# Patient Record
Sex: FEMALE | Race: WHITE | NOT HISPANIC OR LATINO | Employment: FULL TIME | URBAN - NONMETROPOLITAN AREA
[De-identification: names, ages, dates, MRNs, and addresses within clinical notes are randomized per-mention and may not be internally consistent; named-entity substitution may affect disease eponyms.]

---

## 2023-03-07 ENCOUNTER — HOSPITAL ENCOUNTER (EMERGENCY)
Facility: HOSPITAL | Age: 18
Discharge: HOME | End: 2023-03-07
Attending: EMERGENCY MEDICINE | Admitting: EMERGENCY MEDICINE
Payer: MEDICAID

## 2023-03-07 VITALS
TEMPERATURE: 99.5 F | HEIGHT: 66 IN | BODY MASS INDEX: 17.68 KG/M2 | RESPIRATION RATE: 18 BRPM | WEIGHT: 110 LBS | HEART RATE: 92 BPM | SYSTOLIC BLOOD PRESSURE: 107 MMHG | DIASTOLIC BLOOD PRESSURE: 67 MMHG | OXYGEN SATURATION: 99 %

## 2023-03-07 DIAGNOSIS — J03.90 TONSILLITIS: Primary | ICD-10-CM

## 2023-03-07 DIAGNOSIS — R59.0 LYMPHADENOPATHY, CERVICAL: ICD-10-CM

## 2023-03-07 LAB
ALBUMIN SERPL-MCNC: 3.7 G/DL (ref 3.5–5.2)
ALBUMIN/GLOB SERPL: 1 G/DL
ALP SERPL-CCNC: 71 U/L (ref 39–117)
ALT SERPL W P-5'-P-CCNC: 5 U/L (ref 1–33)
ANION GAP SERPL CALCULATED.3IONS-SCNC: 9.5 MMOL/L (ref 5–15)
AST SERPL-CCNC: 10 U/L (ref 1–32)
B-HCG UR QL: NEGATIVE
BASOPHILS # BLD AUTO: 0.06 10*3/MM3 (ref 0–0.2)
BASOPHILS NFR BLD AUTO: 0.4 % (ref 0–1.5)
BILIRUB SERPL-MCNC: 0.3 MG/DL (ref 0–1.2)
BUN SERPL-MCNC: 5 MG/DL (ref 6–20)
BUN/CREAT SERPL: 12.5 (ref 7–25)
CALCIUM SPEC-SCNC: 8.9 MG/DL (ref 8.6–10.5)
CHLORIDE SERPL-SCNC: 102 MMOL/L (ref 98–107)
CO2 SERPL-SCNC: 21.5 MMOL/L (ref 22–29)
CREAT SERPL-MCNC: 0.4 MG/DL (ref 0.57–1)
DEPRECATED RDW RBC AUTO: 41.8 FL (ref 37–54)
EGFRCR SERPLBLD CKD-EPI 2021: 146.4 ML/MIN/1.73
EOSINOPHIL # BLD AUTO: 0.02 10*3/MM3 (ref 0–0.4)
EOSINOPHIL NFR BLD AUTO: 0.1 % (ref 0.3–6.2)
ERYTHROCYTE [DISTWIDTH] IN BLOOD BY AUTOMATED COUNT: 15.4 % (ref 12.3–15.4)
GLOBULIN UR ELPH-MCNC: 3.6 GM/DL
GLUCOSE SERPL-MCNC: 106 MG/DL (ref 65–99)
HCT VFR BLD AUTO: 38.8 % (ref 34–46.6)
HETEROPH AB SER QL LA: NEGATIVE
HGB BLD-MCNC: 12.3 G/DL (ref 12–15.9)
IMM GRANULOCYTES # BLD AUTO: 0.04 10*3/MM3 (ref 0–0.05)
IMM GRANULOCYTES NFR BLD AUTO: 0.3 % (ref 0–0.5)
LYMPHOCYTES # BLD AUTO: 2.27 10*3/MM3 (ref 0.7–3.1)
LYMPHOCYTES NFR BLD AUTO: 16.5 % (ref 19.6–45.3)
MCH RBC QN AUTO: 23.9 PG (ref 26.6–33)
MCHC RBC AUTO-ENTMCNC: 31.7 G/DL (ref 31.5–35.7)
MCV RBC AUTO: 75.5 FL (ref 79–97)
MONOCYTES # BLD AUTO: 1.55 10*3/MM3 (ref 0.1–0.9)
MONOCYTES NFR BLD AUTO: 11.3 % (ref 5–12)
NEUTROPHILS NFR BLD AUTO: 71.4 % (ref 42.7–76)
NEUTROPHILS NFR BLD AUTO: 9.79 10*3/MM3 (ref 1.7–7)
NRBC BLD AUTO-RTO: 0 /100 WBC (ref 0–0.2)
PLATELET # BLD AUTO: 259 10*3/MM3 (ref 140–450)
PMV BLD AUTO: 9.8 FL (ref 6–12)
POTASSIUM SERPL-SCNC: 3.7 MMOL/L (ref 3.5–5.2)
PROT SERPL-MCNC: 7.3 G/DL (ref 6–8.5)
RBC # BLD AUTO: 5.14 10*6/MM3 (ref 3.77–5.28)
S PYO AG THROAT QL: NEGATIVE
SODIUM SERPL-SCNC: 133 MMOL/L (ref 136–145)
WBC NRBC COR # BLD: 13.73 10*3/MM3 (ref 3.4–10.8)

## 2023-03-07 PROCEDURE — 85025 COMPLETE CBC W/AUTO DIFF WBC: CPT | Performed by: PHYSICIAN ASSISTANT

## 2023-03-07 PROCEDURE — 36415 COLL VENOUS BLD VENIPUNCTURE: CPT

## 2023-03-07 PROCEDURE — 81025 URINE PREGNANCY TEST: CPT | Performed by: PHYSICIAN ASSISTANT

## 2023-03-07 PROCEDURE — 87081 CULTURE SCREEN ONLY: CPT | Performed by: PHYSICIAN ASSISTANT

## 2023-03-07 PROCEDURE — 80053 COMPREHEN METABOLIC PANEL: CPT | Performed by: PHYSICIAN ASSISTANT

## 2023-03-07 PROCEDURE — 87880 STREP A ASSAY W/OPTIC: CPT | Performed by: PHYSICIAN ASSISTANT

## 2023-03-07 PROCEDURE — 25010000002 KETOROLAC TROMETHAMINE PER 15 MG: Performed by: PHYSICIAN ASSISTANT

## 2023-03-07 PROCEDURE — 86308 HETEROPHILE ANTIBODY SCREEN: CPT | Performed by: PHYSICIAN ASSISTANT

## 2023-03-07 PROCEDURE — 99283 EMERGENCY DEPT VISIT LOW MDM: CPT

## 2023-03-07 PROCEDURE — 96374 THER/PROPH/DIAG INJ IV PUSH: CPT

## 2023-03-07 PROCEDURE — 87147 CULTURE TYPE IMMUNOLOGIC: CPT | Performed by: PHYSICIAN ASSISTANT

## 2023-03-07 RX ORDER — AMOXICILLIN 500 MG/1
1000 CAPSULE ORAL DAILY
Qty: 18 CAPSULE | Refills: 0 | Status: SHIPPED | OUTPATIENT
Start: 2023-03-08 | End: 2023-03-17

## 2023-03-07 RX ORDER — ONDANSETRON 4 MG/1
4 TABLET, ORALLY DISINTEGRATING ORAL EVERY 6 HOURS PRN
Qty: 8 TABLET | Refills: 0 | Status: SHIPPED | OUTPATIENT
Start: 2023-03-07 | End: 2023-03-09

## 2023-03-07 RX ORDER — KETOROLAC TROMETHAMINE 30 MG/ML
15 INJECTION, SOLUTION INTRAMUSCULAR; INTRAVENOUS ONCE
Status: COMPLETED | OUTPATIENT
Start: 2023-03-07 | End: 2023-03-07

## 2023-03-07 RX ORDER — AMOXICILLIN 500 MG/1
1000 CAPSULE ORAL ONCE
Status: COMPLETED | OUTPATIENT
Start: 2023-03-07 | End: 2023-03-07

## 2023-03-07 RX ORDER — SODIUM CHLORIDE 0.9 % (FLUSH) 0.9 %
10 SYRINGE (ML) INJECTION AS NEEDED
Status: DISCONTINUED | OUTPATIENT
Start: 2023-03-07 | End: 2023-03-07 | Stop reason: HOSPADM

## 2023-03-07 RX ADMIN — AMOXICILLIN 1000 MG: 500 CAPSULE ORAL at 11:21

## 2023-03-07 RX ADMIN — KETOROLAC TROMETHAMINE 15 MG: 30 INJECTION, SOLUTION INTRAMUSCULAR; INTRAVENOUS at 10:51

## 2023-03-07 RX ADMIN — SODIUM CHLORIDE 1000 ML: 9 INJECTION, SOLUTION INTRAVENOUS at 09:38

## 2023-03-07 NOTE — DISCHARGE INSTRUCTIONS
Symptoms could be related to bacterial or viral illness.  Mono test was negative here today but may need to be retested since she can have false negatives early in the disease course.  Take antibiotics as directed until medication is complete to help treat any underlying bacterial etiology.  You can take Zofran as needed for nausea vomiting.  Alternate ibuprofen and Tylenol to help with body aches, fever, pain.  Drink plenty of fluids to stay well-hydrated.  Try to follow-up with primary care provider in the next 1 to 2 days to reevaluate symptoms and ensure you are improving.  Return to the ER for any change, worsening symptoms, or any additional concerns including but not limited to inability to manage secretions or swallow, persistent fever, intractable vomiting, swelling of the neck or throat.

## 2023-03-07 NOTE — ED PROVIDER NOTES
Subjective  History of Present Illness:    Chief Complaint: Sore throat  History of Present Illness: Patient is a 17-year-old female with history of arthritis and autoimmune disease presenting to the ER for evaluation of sore throat.  Patient states for the past 4 days she has had headache, sore throat, body aches and fever.  Reportedly she was seen in urgent treatment center yesterday where she tested negative for COVID, influenza and strep.  They gave her steroids which are not helping.  She states that she has an enlarged lymph node on the right side of her neck that is tender to palpation.  She denies any vision loss or changes, neck pain or stiffness, inability to swallow or manage secretions, chest pain, cough, shortness of breath, abdominal pain, dysuria, hematuria, emesis, diarrhea, or any other symptoms.  She denies any known recent sick contacts.  Onset: 4 days  Duration: Persistent  Exacerbating / Alleviating factors: None  Associated symptoms: Fever, body aches, headache      Nurses Notes reviewed and agree, including vitals, allergies, social history and prior medical history.     REVIEW OF SYSTEMS: All systems reviewed and not pertinent unless noted.  Review of Systems      Positive for: Fever, body aches, headache, sore throat    Negative for: Vision loss or changes, neck pain or stiffness, inability to manage secretions, chest pain, cough, shortness of breath, dizziness, syncope, abdominal pain, emesis, diarrhea, dysuria, hematuria    Past Medical History:   Diagnosis Date   • Arthritis    • Autoimmune disease (HCC)        Allergies:    Patient has no known allergies.      History reviewed. No pertinent surgical history.      Social History     Socioeconomic History   • Marital status: Unknown   Tobacco Use   • Smoking status: Never   • Smokeless tobacco: Never   Vaping Use   • Vaping Use: Every day   • Substances: Nicotine, Flavoring   • Devices: Disposable   Substance and Sexual Activity   •  "Alcohol use: Yes   • Drug use: Never   • Sexual activity: Defer         History reviewed. No pertinent family history.    Objective  Physical Exam:  /67 (BP Location: Left arm, Patient Position: Sitting)   Pulse 92   Temp 99.5 °F (37.5 °C) (Oral)   Resp 18   Ht 167.6 cm (66\")   Wt 49.9 kg (110 lb)   LMP 03/01/2023 (Exact Date)   SpO2 99%   BMI 17.75 kg/m²      Physical Exam    CONSTITUTIONAL: Well developed, no acute distress, nontoxic in appearance.  She is awake, alert, speaking in full sentences.  She is managing secretions without difficulty  VITAL SIGNS: per nursing, reviewed and noted  Neck: Right-sided cervical lymphadenopathy palpated  SKIN: exposed skin with no rashes, ulcerations or petechiae  EYES: Grossly EOMI, no icterus  ENT: Normal voice.  Posterior pharynx with some mild erythema.  Patient has tonsillar exudate bilaterally without significant edema, uvula midline, soft palate elevates symmetric with phonation.  Tympanic membranes are clear bilaterally without bulging or erythema  RESPIRATORY:  No increased work of breathing. No retractions.   CARDIOVASCULAR:  regular rate and rhythm  GI: Abdomen soft, nontender, normal bowel sounds. No hernia. No ascites.  MUSCULOSKELETAL:  No tenderness. Full ROM. Strength and tone grossly normal.  NEUROLOGIC: Alert, oriented x 3. No gross deficits. GCS 15.   PSYCH: appropriate affect.    Procedures    ED Course:        ED Course as of 03/07/23 1701   Tue Mar 07, 2023   0852 WBC(!): 13.73 [LA]   0943 Hemoglobin: 12.3 [LA]   0943 Platelets: 259 [LA]   0947 Strep A Ag: Negative [LA]   0947 HCG, Urine QL: Negative [LA]   1008 Glucose(!): 106 [LA]   1008 BUN(!): 5 [LA]   1008 Creatinine(!): 0.40 [LA]   1008 Sodium(!): 133 [LA]   1008 Potassium: 3.7 [LA]   1008 Chloride: 102 [LA]   1008 CO2(!): 21.5 [LA]   1008 Calcium: 8.9 [LA]   1008 Total Protein: 7.3 [LA]   1008 Albumin: 3.7 [LA]   1008 ALT (SGPT): 5 [LA]   1008 AST (SGOT): 10 [LA]   1008 Alkaline " Phosphatase: 71 [LA]   1008 Total Bilirubin: 0.3 [LA]   1008 Globulin: 3.6 [LA]   1008 BUN/Creatinine Ratio: 12.5 [LA]   1008 Anion Gap: 9.5 [LA]   1008 eGFR: 146.4 [LA]   1011 Monospot: Negative [LA]   1107 Patient resting comfortably in the stretcher.  We will go ahead and treat with an antibiotic to cover any bacterial etiology, discussed follow-up with primary care provider strict return precautions to the ER. [LA]      ED Course User Index  [LA] Diana Florentino PA-C       Lab Results (last 24 hours)     Procedure Component Value Units Date/Time    Rapid Strep A Screen - Swab, Throat [[id number]]  (Normal) Collected: 03/07/23 0935    Specimen: Swab from Throat Updated: 03/07/23 0947     Strep A Ag Negative    Beta Strep Culture, Throat - Swab, Throat [[id number]] Collected: 03/07/23 0935    Specimen: Swab from Throat Updated: 03/07/23 0947    CBC & Differential [[id number]]  (Abnormal) Collected: 03/07/23 0936    Specimen: Blood Updated: 03/07/23 0942    Narrative:      The following orders were created for panel order CBC & Differential.  Procedure                               Abnormality         Status                     ---------                               -----------         ------                     CBC Auto Differential[[id number]]        Abnormal            Final result                 Please view results for these tests on the individual orders.    Comprehensive Metabolic Panel [[id number]]  (Abnormal) Collected: 03/07/23 0936    Specimen: Blood Updated: 03/07/23 1008     Glucose 106 mg/dL      BUN 5 mg/dL      Creatinine 0.40 mg/dL      Sodium 133 mmol/L      Potassium 3.7 mmol/L      Chloride 102 mmol/L      CO2 21.5 mmol/L      Calcium 8.9 mg/dL      Total Protein 7.3 g/dL      Albumin 3.7 g/dL      ALT (SGPT) 5 U/L      AST (SGOT) 10 U/L      Alkaline Phosphatase 71 U/L      Total Bilirubin 0.3 mg/dL      Globulin 3.6 gm/dL      A/G Ratio 1.0 g/dL      BUN/Creatinine Ratio 12.5     Anion Gap 9.5  mmol/L      eGFR 146.4 mL/min/1.73     Narrative:      GFR Normal >60  Chronic Kidney Disease <60  Kidney Failure <15      Mononucleosis Screen [[id number]]  (Normal) Collected: 03/07/23 0936    Specimen: Blood Updated: 03/07/23 1010     Monospot Negative    CBC Auto Differential [[id number]]  (Abnormal) Collected: 03/07/23 0936    Specimen: Blood Updated: 03/07/23 0942     WBC 13.73 10*3/mm3      RBC 5.14 10*6/mm3      Hemoglobin 12.3 g/dL      Hematocrit 38.8 %      MCV 75.5 fL      MCH 23.9 pg      MCHC 31.7 g/dL      RDW 15.4 %      RDW-SD 41.8 fl      MPV 9.8 fL      Platelets 259 10*3/mm3      Neutrophil % 71.4 %      Lymphocyte % 16.5 %      Monocyte % 11.3 %      Eosinophil % 0.1 %      Basophil % 0.4 %      Immature Grans % 0.3 %      Neutrophils, Absolute 9.79 10*3/mm3      Lymphocytes, Absolute 2.27 10*3/mm3      Monocytes, Absolute 1.55 10*3/mm3      Eosinophils, Absolute 0.02 10*3/mm3      Basophils, Absolute 0.06 10*3/mm3      Immature Grans, Absolute 0.04 10*3/mm3      nRBC 0.0 /100 WBC     Pregnancy, Urine - Urine, Clean Catch [[id number]]  (Normal) Collected: 03/07/23 0937    Specimen: Urine, Clean Catch Updated: 03/07/23 0955     HCG, Urine QL Negative           No radiology results from the last 24 hrs       MDM    Initial impression of presenting illness: Patient is a 17-year-old female presenting to the ER for evaluation of sore throat and headache.  On arrival she is awake alert, speaking in full sentences.  She is managing secretions without difficulty    DDX: includes but is not limited to: Viral illness, mononucleosis, tonsillitis, lymphadenitis, strep pharyngitis, and other concerns.  I have low concern for epiglottitis, tonsillar retropharyngeal abscess given patient is managing secretions without difficulty    Patient arrives in stable condition with vitals interpreted by myself.     Pertinent features from physical exam: Bilateral tonsillar exudate, no nuchal rigidity, right-sided cervical  lymphadenopathy    Initial diagnostic plan: Obtain basic labs, mononucleosis, urine pregnancy test we will give IV fluids, Tylenol    Results from initial plan were reviewed and interpreted by me revealing leukocytosis of 13.73, no other significant electrolyte abnormalities.  Mono and pregnancy test negative.  Rapid strep negative.  This was sent for culture.    Diagnostic information from other sources: Chart review    Interventions / Re-evaluation: Patient remained stable throughout visit here.    Results/clinical rationale were discussed with patient and grandfather at bedside with her permission.  Discussed that this looks like it could be mononucleosis versus a tonsillitis.  I had low concern for abscess given she was able to manage secretions in there and there is no peritonsillar edema.  We will go ahead and treat with antibiotics to cover any light underlying bacterial etiology, discussed other symptomatic care, follow-up and strict return precautions.  She verbalized understanding was in agreement with this plan of care    Consultations/Discussion of results with other physicians: Dr. Martin    Disposition plan: Discharge  -----    Final diagnoses:   Tonsillitis   Lymphadenopathy, cervical        Diana Florentino PA-C  03/07/23 3777

## 2023-03-08 LAB — BACTERIA SPEC AEROBE CULT: ABNORMAL

## 2023-04-16 ENCOUNTER — APPOINTMENT (OUTPATIENT)
Dept: GENERAL RADIOLOGY | Facility: HOSPITAL | Age: 18
End: 2023-04-16
Payer: MEDICAID

## 2023-04-16 ENCOUNTER — HOSPITAL ENCOUNTER (EMERGENCY)
Facility: HOSPITAL | Age: 18
Discharge: HOME | End: 2023-04-16
Attending: EMERGENCY MEDICINE | Admitting: EMERGENCY MEDICINE
Payer: MEDICAID

## 2023-04-16 VITALS
RESPIRATION RATE: 14 BRPM | DIASTOLIC BLOOD PRESSURE: 77 MMHG | SYSTOLIC BLOOD PRESSURE: 121 MMHG | WEIGHT: 100 LBS | TEMPERATURE: 97.8 F | BODY MASS INDEX: 18.4 KG/M2 | HEIGHT: 62 IN | OXYGEN SATURATION: 100 % | HEART RATE: 89 BPM

## 2023-04-16 DIAGNOSIS — R91.1 LUNG NODULE SEEN ON IMAGING STUDY: ICD-10-CM

## 2023-04-16 DIAGNOSIS — R07.9 CHEST PAIN, UNSPECIFIED TYPE: Primary | ICD-10-CM

## 2023-04-16 LAB
ALBUMIN SERPL-MCNC: 4.6 G/DL (ref 3.5–5.2)
ALBUMIN/GLOB SERPL: 1.4 G/DL
ALP SERPL-CCNC: 89 U/L (ref 39–117)
ALT SERPL W P-5'-P-CCNC: 7 U/L (ref 1–33)
ANION GAP SERPL CALCULATED.3IONS-SCNC: 10.9 MMOL/L (ref 5–15)
AST SERPL-CCNC: 14 U/L (ref 1–32)
BASOPHILS # BLD AUTO: 0.1 10*3/MM3 (ref 0–0.2)
BASOPHILS NFR BLD AUTO: 0.7 % (ref 0–1.5)
BILIRUB SERPL-MCNC: 0.2 MG/DL (ref 0–1.2)
BUN SERPL-MCNC: 6 MG/DL (ref 6–20)
BUN/CREAT SERPL: 13.6 (ref 7–25)
CALCIUM SPEC-SCNC: 9.3 MG/DL (ref 8.6–10.5)
CHLORIDE SERPL-SCNC: 102 MMOL/L (ref 98–107)
CO2 SERPL-SCNC: 22.1 MMOL/L (ref 22–29)
CREAT SERPL-MCNC: 0.44 MG/DL (ref 0.57–1)
DEPRECATED RDW RBC AUTO: 44.5 FL (ref 37–54)
EGFRCR SERPLBLD CKD-EPI 2021: 143.1 ML/MIN/1.73
EOSINOPHIL # BLD AUTO: 0.06 10*3/MM3 (ref 0–0.4)
EOSINOPHIL NFR BLD AUTO: 0.4 % (ref 0.3–6.2)
ERYTHROCYTE [DISTWIDTH] IN BLOOD BY AUTOMATED COUNT: 16.1 % (ref 12.3–15.4)
GLOBULIN UR ELPH-MCNC: 3.4 GM/DL
GLUCOSE SERPL-MCNC: 97 MG/DL (ref 65–99)
HCT VFR BLD AUTO: 44.8 % (ref 34–46.6)
HGB BLD-MCNC: 13.8 G/DL (ref 12–15.9)
IMM GRANULOCYTES # BLD AUTO: 0.06 10*3/MM3 (ref 0–0.05)
IMM GRANULOCYTES NFR BLD AUTO: 0.4 % (ref 0–0.5)
LYMPHOCYTES # BLD AUTO: 2.67 10*3/MM3 (ref 0.7–3.1)
LYMPHOCYTES NFR BLD AUTO: 17.9 % (ref 19.6–45.3)
MCH RBC QN AUTO: 23.6 PG (ref 26.6–33)
MCHC RBC AUTO-ENTMCNC: 30.8 G/DL (ref 31.5–35.7)
MCV RBC AUTO: 76.6 FL (ref 79–97)
MONOCYTES # BLD AUTO: 0.66 10*3/MM3 (ref 0.1–0.9)
MONOCYTES NFR BLD AUTO: 4.4 % (ref 5–12)
NEUTROPHILS NFR BLD AUTO: 11.4 10*3/MM3 (ref 1.7–7)
NEUTROPHILS NFR BLD AUTO: 76.2 % (ref 42.7–76)
NRBC BLD AUTO-RTO: 0 /100 WBC (ref 0–0.2)
PLATELET # BLD AUTO: 414 10*3/MM3 (ref 140–450)
PMV BLD AUTO: 10.3 FL (ref 6–12)
POTASSIUM SERPL-SCNC: 4.4 MMOL/L (ref 3.5–5.2)
PROT SERPL-MCNC: 8 G/DL (ref 6–8.5)
RBC # BLD AUTO: 5.85 10*6/MM3 (ref 3.77–5.28)
SODIUM SERPL-SCNC: 135 MMOL/L (ref 136–145)
TROPONIN T SERPL HS-MCNC: <6 NG/L
WBC NRBC COR # BLD: 14.95 10*3/MM3 (ref 3.4–10.8)

## 2023-04-16 PROCEDURE — 93005 ELECTROCARDIOGRAM TRACING: CPT

## 2023-04-16 PROCEDURE — 85025 COMPLETE CBC W/AUTO DIFF WBC: CPT

## 2023-04-16 PROCEDURE — 99283 EMERGENCY DEPT VISIT LOW MDM: CPT

## 2023-04-16 PROCEDURE — 80053 COMPREHEN METABOLIC PANEL: CPT

## 2023-04-16 PROCEDURE — 71045 X-RAY EXAM CHEST 1 VIEW: CPT

## 2023-04-16 PROCEDURE — 36415 COLL VENOUS BLD VENIPUNCTURE: CPT

## 2023-04-16 PROCEDURE — 84484 ASSAY OF TROPONIN QUANT: CPT

## 2023-04-16 NOTE — DISCHARGE INSTRUCTIONS
Return to emergency room for any worsening symptoms.  Recommend following up with primary care for further evaluation in addition to the finding on the chest x-ray today of the lung nodule.  Furthermore recommend following up with cardiology.  Please come back to ER for any worsening chest pain or shortness of air.

## 2023-04-16 NOTE — ED PROVIDER NOTES
"Subjective  History of Present Illness:    This is a 17-year-old female presents emergency room today, history of autoimmune disease, arthritis, and anxiety/panic attacks who presents to the emergency room today for evaluation of lung pain.  Patient reports that this woke her out of her sleep.  Reports that she woke up, had lung pain bilaterally underneath her ribs, and felt like she was going to die.  She arrived via EMS.  Reports that her symptoms have now subsided and she does not have any chest pain shortness of air or any other physical symptoms at this time.  She does have history of mild panic attacks in the past.  She was recently placed on a beta-blocker for situational anxiety.  Denies any chest pain.  Denies any unilateral leg pain or swelling.  Denies any hemoptysis.  No recent travel.  No falls or injuries to the chest.  She is not on any birth control.  Denies any cough congestion or runny nose.  Denies any fevers.  She reports that she also has not had her ADHD medicine in approximately 1 week at this time.  She did report shortness of air associated with worsening lung pain with inspiration when it occurred.      Nurses Notes reviewed and agree, including vitals, allergies, social history and prior medical history.     REVIEW OF SYSTEMS: All systems reviewed and not pertinent unless noted.  Review of Systems   Constitutional: Negative for fever.   Respiratory: Positive for shortness of breath. Negative for cough.         Reports \"lung pain\".   Cardiovascular: Negative for chest pain, palpitations and leg swelling.   Gastrointestinal: Negative for nausea and vomiting.   All other systems reviewed and are negative.      Past Medical History:   Diagnosis Date   • Arthritis    • Autoimmune disease        Allergies:    Patient has no known allergies.      History reviewed. No pertinent surgical history.      Social History     Socioeconomic History   • Marital status: Single   Tobacco Use   • Smoking " "status: Never   • Smokeless tobacco: Never   Vaping Use   • Vaping Use: Every day   • Substances: Nicotine, Flavoring   • Devices: Disposable   Substance and Sexual Activity   • Alcohol use: Yes   • Drug use: Never   • Sexual activity: Defer         History reviewed. No pertinent family history.    Objective  Physical Exam:  /70 (BP Location: Left arm, Patient Position: Sitting)   Pulse 70   Temp 97.5 °F (36.4 °C) (Oral)   Resp 16   Ht 157.5 cm (62\")   Wt 45.4 kg (100 lb)   SpO2 100%   BMI 18.29 kg/m²      Physical Exam  Constitutional:       General: She is not in acute distress.     Appearance: Normal appearance. She is normal weight. She is not ill-appearing, toxic-appearing or diaphoretic.   HENT:      Head: Normocephalic and atraumatic.      Nose: Nose normal. No congestion or rhinorrhea.      Mouth/Throat:      Mouth: Mucous membranes are moist.      Pharynx: Oropharynx is clear. No oropharyngeal exudate or posterior oropharyngeal erythema.   Eyes:      Extraocular Movements: Extraocular movements intact.      Pupils: Pupils are equal, round, and reactive to light.   Cardiovascular:      Rate and Rhythm: Normal rate and regular rhythm.      Pulses: Normal pulses.   Pulmonary:      Effort: Pulmonary effort is normal. No respiratory distress.      Breath sounds: Normal breath sounds. No stridor. No wheezing, rhonchi or rales.   Chest:      Chest wall: No tenderness.   Abdominal:      General: There is no distension.      Palpations: Abdomen is soft.      Tenderness: There is no abdominal tenderness. There is no guarding.   Musculoskeletal:         General: Normal range of motion.      Cervical back: Normal range of motion and neck supple. No rigidity or tenderness.   Skin:     General: Skin is warm and dry.      Capillary Refill: Capillary refill takes less than 2 seconds.   Neurological:      General: No focal deficit present.      Mental Status: She is alert and oriented to person, place, and " time.   Psychiatric:         Mood and Affect: Mood normal.         Behavior: Behavior normal.         Thought Content: Thought content normal.         Judgment: Judgment normal.             Procedures    ED Course:    ED Course as of 04/16/23 1652   Sun Apr 16, 2023   1644 EKG interpreted by me reveals sinus bradycardia rate 57.  No ectopy no ischemic changes [PF]      ED Course User Index  [PF] Juanjose Monzon W,        Lab Results (last 24 hours)     Procedure Component Value Units Date/Time    CBC Auto Differential [[id number]]  (Abnormal) Collected: 04/16/23 1605    Specimen: Blood Updated: 04/16/23 1614     WBC 14.95 10*3/mm3      RBC 5.85 10*6/mm3      Hemoglobin 13.8 g/dL      Hematocrit 44.8 %      MCV 76.6 fL      MCH 23.6 pg      MCHC 30.8 g/dL      RDW 16.1 %      RDW-SD 44.5 fl      MPV 10.3 fL      Platelets 414 10*3/mm3      Neutrophil % 76.2 %      Lymphocyte % 17.9 %      Monocyte % 4.4 %      Eosinophil % 0.4 %      Basophil % 0.7 %      Immature Grans % 0.4 %      Neutrophils, Absolute 11.40 10*3/mm3      Lymphocytes, Absolute 2.67 10*3/mm3      Monocytes, Absolute 0.66 10*3/mm3      Eosinophils, Absolute 0.06 10*3/mm3      Basophils, Absolute 0.10 10*3/mm3      Immature Grans, Absolute 0.06 10*3/mm3      nRBC 0.0 /100 WBC     Comprehensive Metabolic Panel [[id number]]  (Abnormal) Collected: 04/16/23 1605    Specimen: Blood Updated: 04/16/23 1632     Glucose 97 mg/dL      BUN 6 mg/dL      Creatinine 0.44 mg/dL      Sodium 135 mmol/L      Potassium 4.4 mmol/L      Chloride 102 mmol/L      CO2 22.1 mmol/L      Calcium 9.3 mg/dL      Total Protein 8.0 g/dL      Albumin 4.6 g/dL      ALT (SGPT) 7 U/L      AST (SGOT) 14 U/L      Alkaline Phosphatase 89 U/L      Total Bilirubin 0.2 mg/dL      Globulin 3.4 gm/dL      A/G Ratio 1.4 g/dL      BUN/Creatinine Ratio 13.6     Anion Gap 10.9 mmol/L      eGFR 143.1 mL/min/1.73     Narrative:      GFR Normal >60  Chronic Kidney Disease <60  Kidney Failure <15       "High Sensitivity Troponin T [[id number]]  (Normal) Collected: 04/16/23 1605    Specimen: Blood Updated: 04/16/23 1634     HS Troponin T <6 ng/L     Narrative:      High Sensitive Troponin T Reference Range:  <10.0 ng/L- Negative Female for AMI  <15.0 ng/L- Negative Male for AMI  >=10 - Abnormal Female indicating possible myocardial injury.  >=15 - Abnormal Male indicating possible myocardial injury.   Clinicians would have to utilize clinical acumen, EKG, Troponin, and serial changes to determine if it is an Acute Myocardial Infarction or myocardial injury due to an underlying chronic condition.                XR Chest 1 View    Result Date: 4/16/2023  PROCEDURE: XR CHEST 1 VW-  INDICATION:  soa lung pain  FINDINGS:  A portable view of the chest was obtained.  There is no prior exam for comparison.  The cardiac and mediastinal silhouettes are within normal limits. There is a nodular density in the right upper lobe. This is possibly calcified. The lungs are otherwise clear.  There is no pleural effusion or pneumothorax.  No acute osseous abnormality is identified.       Impression: Right upper lobe nodule, possibly calcified. Otherwise no acute process. Recommend upright PA and lateral chest x-ray in 3 months.  This report was signed and finalized on 4/16/2023 3:59 PM by Barbara Carias MD.         MDM    Initial impression of presenting illness: This is a 17-year-old female who presents emergency room today for evaluation of \"lung pain\"    DDX: includes but is not limited to: Pneumothorax, PE, ACS, costochondritis, anxiety, panic attack, pneumonia, among other etiologies    Patient arrives hemodynamically stable, nontachycardic, nonhypoxic, nontoxic-appearing and nontachypneic with vitals interpreted by myself.     Patient is PERC and Wells criteria negative placing patient at extremely low risk for PE.    Pertinent features from physical exam: Cardiac auscultation with regular rate and rhythm, lungs clear to " auscultation bilaterally.  2+ radial pulses bilaterally.    Initial diagnostic plan: Chest x-ray, troponin, EKG, CBC, CMP    Results from initial plan were reviewed and interpreted by me revealing white blood cell count of 14.95 however suspect this is likely related to prednisone that the patient is prescribed absence of any other physical complaints.  These laboratory studies are similar to last month.  High-sensitivity troponin within normal limits.  CMP nonactionable. Heart score 0.  EKG sinus bradycardia rate of 57 per attending interpretation.  Given low heart score and asymptomatic at this time, do not feel that repeat troponin is necessary.    Plain film of chest with right upper lung nodule, however no acute other cardiopulmonary process radiology interpretation.  I personally evaluated this plain film and concur with radiology interpretation.    Diagnostic information from other sources: Chart review of prior laboratory studies.    Interventions / Re-evaluation: No interventions necessary at this time.  Patient is asymptomatic.  No acute distress.    Results/clinical rationale were discussed with patient at bedside.  I did make her aware of the lung nodule seen on plain film of the chest.  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  Patient is understanding and agreeable to this.  Given strict return precautions.  Did discuss with her that her troponin and blood work appeared reassuring in addition to her EKG.  Do believe this is likely more related to anxiety or panic attack given patient's history of this.    Consultations/Discussion of results with other physicians: Discussed with attending physician prior to discharge.    Disposition plan: Discharge home, primary care and  cardiology follow-up.  Strict return precautions discussed.  Patient discharged home in good condition agreeable to plan at bedside.  Patient remained asymptomatic throughout her time in the emergency room prior to discharge.  -----    Final diagnoses:   Chest pain, unspecified type   Lung nodule seen on imaging study        Meliton Peralta PA-C  04/16/23 [id number]

## 2025-08-05 ENCOUNTER — TRANSCRIBE ORDERS (OUTPATIENT)
Dept: OBSTETRICS AND GYNECOLOGY | Facility: HOSPITAL | Age: 20
End: 2025-08-05
Payer: COMMERCIAL

## 2025-08-05 DIAGNOSIS — I51.9 CARDIAC DISORDER: Primary | ICD-10-CM

## 2025-08-05 DIAGNOSIS — Z34.90 PREGNANCY, UNSPECIFIED GESTATIONAL AGE: ICD-10-CM

## 2025-08-21 ENCOUNTER — OFFICE VISIT (OUTPATIENT)
Dept: OBSTETRICS AND GYNECOLOGY | Facility: HOSPITAL | Age: 20
End: 2025-08-21
Payer: COMMERCIAL

## 2025-08-21 ENCOUNTER — HOSPITAL ENCOUNTER (OUTPATIENT)
Dept: WOMENS IMAGING | Facility: HOSPITAL | Age: 20
Discharge: HOME | End: 2025-08-21
Payer: COMMERCIAL

## 2025-08-21 VITALS — WEIGHT: 141.4 LBS | BODY MASS INDEX: 25.86 KG/M2 | DIASTOLIC BLOOD PRESSURE: 78 MMHG | SYSTOLIC BLOOD PRESSURE: 136 MMHG

## 2025-08-21 DIAGNOSIS — Z34.90 PREGNANCY, UNSPECIFIED GESTATIONAL AGE: ICD-10-CM

## 2025-08-21 DIAGNOSIS — Z3A.28 28 WEEKS GESTATION OF PREGNANCY: ICD-10-CM

## 2025-08-21 DIAGNOSIS — I47.9 PAROXYSMAL TACHYCARDIA: ICD-10-CM

## 2025-08-21 DIAGNOSIS — Z87.39 HISTORY OF DERMATOMYOSITIS: Primary | ICD-10-CM

## 2025-08-21 DIAGNOSIS — I51.9 CARDIAC DISORDER: ICD-10-CM

## 2025-08-21 PROCEDURE — 76811 OB US DETAILED SNGL FETUS: CPT

## 2025-08-21 RX ORDER — METOPROLOL TARTRATE 25 MG/1
12.5 TABLET, FILM COATED ORAL 2 TIMES DAILY
Qty: 30 TABLET | Refills: 0 | Status: SHIPPED | OUTPATIENT
Start: 2025-08-21

## 2025-08-21 RX ORDER — PRENATAL VIT/IRON FUM/FOLIC AC 27MG-0.8MG
1 TABLET ORAL DAILY
COMMUNITY

## 2025-08-21 RX ORDER — CHOLECALCIFEROL (VITAMIN D3) 25 MCG
2000 TABLET ORAL DAILY
COMMUNITY

## 2025-08-22 DIAGNOSIS — Z87.39 HISTORY OF DERMATOMYOSITIS: Primary | ICD-10-CM

## 2025-08-22 DIAGNOSIS — I47.9 PAROXYSMAL TACHYCARDIA: ICD-10-CM
